# Patient Record
Sex: FEMALE | Race: WHITE | Employment: FULL TIME | URBAN - METROPOLITAN AREA
[De-identification: names, ages, dates, MRNs, and addresses within clinical notes are randomized per-mention and may not be internally consistent; named-entity substitution may affect disease eponyms.]

---

## 2022-07-14 ENCOUNTER — HOSPITAL ENCOUNTER (OUTPATIENT)
Age: 52
Setting detail: OBSERVATION
Discharge: HOME | End: 2022-07-15
Attending: EMERGENCY MEDICINE | Admitting: INTERNAL MEDICINE
Payer: COMMERCIAL

## 2022-07-14 ENCOUNTER — APPOINTMENT (OUTPATIENT)
Dept: GENERAL RADIOLOGY | Age: 52
End: 2022-07-14
Payer: COMMERCIAL

## 2022-07-14 DIAGNOSIS — R07.89 CHEST PRESSURE: Primary | ICD-10-CM

## 2022-07-14 DIAGNOSIS — R03.0 ELEVATED BLOOD PRESSURE READING: ICD-10-CM

## 2022-07-14 PROBLEM — I10 HTN (HYPERTENSION): Status: ACTIVE | Noted: 2022-07-14

## 2022-07-14 PROBLEM — K21.9 GERD (GASTROESOPHAGEAL REFLUX DISEASE): Status: ACTIVE | Noted: 2022-07-14

## 2022-07-14 PROBLEM — F32.A DEPRESSION: Status: ACTIVE | Noted: 2022-07-14

## 2022-07-14 PROBLEM — I10 UNCONTROLLED HYPERTENSION: Status: ACTIVE | Noted: 2022-07-14

## 2022-07-14 LAB
A/G RATIO: 1.7 (ref 1.1–2.2)
ALBUMIN SERPL-MCNC: 4.5 G/DL (ref 3.4–5)
ALP BLD-CCNC: 72 U/L (ref 40–129)
ALT SERPL-CCNC: 16 U/L (ref 10–40)
ANION GAP SERPL CALCULATED.3IONS-SCNC: 11 MMOL/L (ref 3–16)
AST SERPL-CCNC: 20 U/L (ref 15–37)
BASOPHILS ABSOLUTE: 0.1 K/UL (ref 0–0.2)
BASOPHILS RELATIVE PERCENT: 1 %
BILIRUB SERPL-MCNC: 0.6 MG/DL (ref 0–1)
BILIRUBIN URINE: NEGATIVE
BLOOD, URINE: NEGATIVE
BUN BLDV-MCNC: 7 MG/DL (ref 7–20)
CALCIUM SERPL-MCNC: 9.3 MG/DL (ref 8.3–10.6)
CHLORIDE BLD-SCNC: 96 MMOL/L (ref 99–110)
CLARITY: CLEAR
CO2: 26 MMOL/L (ref 21–32)
COLOR: YELLOW
CREAT SERPL-MCNC: 0.7 MG/DL (ref 0.6–1.1)
EKG ATRIAL RATE: 65 BPM
EKG DIAGNOSIS: NORMAL
EKG P AXIS: 8 DEGREES
EKG P-R INTERVAL: 160 MS
EKG Q-T INTERVAL: 436 MS
EKG QRS DURATION: 86 MS
EKG QTC CALCULATION (BAZETT): 453 MS
EKG R AXIS: 41 DEGREES
EKG T AXIS: 27 DEGREES
EKG VENTRICULAR RATE: 65 BPM
EOSINOPHILS ABSOLUTE: 0.3 K/UL (ref 0–0.6)
EOSINOPHILS RELATIVE PERCENT: 4 %
GFR AFRICAN AMERICAN: >60
GFR NON-AFRICAN AMERICAN: >60
GLUCOSE BLD-MCNC: 99 MG/DL (ref 70–99)
GLUCOSE URINE: NEGATIVE MG/DL
HCG QUALITATIVE: NEGATIVE
HCT VFR BLD CALC: 45.9 % (ref 36–48)
HEMOGLOBIN: 15.9 G/DL (ref 12–16)
KETONES, URINE: NEGATIVE MG/DL
LEUKOCYTE ESTERASE, URINE: NEGATIVE
LYMPHOCYTES ABSOLUTE: 2.2 K/UL (ref 1–5.1)
LYMPHOCYTES RELATIVE PERCENT: 25.5 %
MCH RBC QN AUTO: 34 PG (ref 26–34)
MCHC RBC AUTO-ENTMCNC: 34.5 G/DL (ref 31–36)
MCV RBC AUTO: 98.4 FL (ref 80–100)
MICROSCOPIC EXAMINATION: NORMAL
MONOCYTES ABSOLUTE: 0.6 K/UL (ref 0–1.3)
MONOCYTES RELATIVE PERCENT: 6.6 %
NEUTROPHILS ABSOLUTE: 5.4 K/UL (ref 1.7–7.7)
NEUTROPHILS RELATIVE PERCENT: 62.9 %
NITRITE, URINE: NEGATIVE
PDW BLD-RTO: 13.5 % (ref 12.4–15.4)
PH UA: 6 (ref 5–8)
PLATELET # BLD: 334 K/UL (ref 135–450)
PMV BLD AUTO: 6.6 FL (ref 5–10.5)
POTASSIUM REFLEX MAGNESIUM: 3.8 MMOL/L (ref 3.5–5.1)
PRO-BNP: 300 PG/ML (ref 0–124)
PROTEIN UA: NEGATIVE MG/DL
RBC # BLD: 4.67 M/UL (ref 4–5.2)
SODIUM BLD-SCNC: 133 MMOL/L (ref 136–145)
SPECIFIC GRAVITY UA: 1.01 (ref 1–1.03)
TOTAL PROTEIN: 7.1 G/DL (ref 6.4–8.2)
TROPONIN: <0.01 NG/ML
TROPONIN: <0.01 NG/ML
URINE REFLEX TO CULTURE: NORMAL
URINE TYPE: NORMAL
UROBILINOGEN, URINE: 0.2 E.U./DL
WBC # BLD: 8.6 K/UL (ref 4–11)

## 2022-07-14 PROCEDURE — 2580000003 HC RX 258: Performed by: INTERNAL MEDICINE

## 2022-07-14 PROCEDURE — 83880 ASSAY OF NATRIURETIC PEPTIDE: CPT

## 2022-07-14 PROCEDURE — 99285 EMERGENCY DEPT VISIT HI MDM: CPT

## 2022-07-14 PROCEDURE — G0378 HOSPITAL OBSERVATION PER HR: HCPCS

## 2022-07-14 PROCEDURE — 6370000000 HC RX 637 (ALT 250 FOR IP): Performed by: EMERGENCY MEDICINE

## 2022-07-14 PROCEDURE — 93010 ELECTROCARDIOGRAM REPORT: CPT | Performed by: INTERNAL MEDICINE

## 2022-07-14 PROCEDURE — 93005 ELECTROCARDIOGRAM TRACING: CPT | Performed by: EMERGENCY MEDICINE

## 2022-07-14 PROCEDURE — 84484 ASSAY OF TROPONIN QUANT: CPT

## 2022-07-14 PROCEDURE — 80053 COMPREHEN METABOLIC PANEL: CPT

## 2022-07-14 PROCEDURE — 6370000000 HC RX 637 (ALT 250 FOR IP): Performed by: INTERNAL MEDICINE

## 2022-07-14 PROCEDURE — 71045 X-RAY EXAM CHEST 1 VIEW: CPT

## 2022-07-14 PROCEDURE — 36415 COLL VENOUS BLD VENIPUNCTURE: CPT

## 2022-07-14 PROCEDURE — 81003 URINALYSIS AUTO W/O SCOPE: CPT

## 2022-07-14 PROCEDURE — 84703 CHORIONIC GONADOTROPIN ASSAY: CPT

## 2022-07-14 PROCEDURE — 85025 COMPLETE CBC W/AUTO DIFF WBC: CPT

## 2022-07-14 RX ORDER — ESCITALOPRAM OXALATE 10 MG/1
20 TABLET ORAL DAILY
Status: DISCONTINUED | OUTPATIENT
Start: 2022-07-15 | End: 2022-07-15 | Stop reason: HOSPADM

## 2022-07-14 RX ORDER — ATORVASTATIN CALCIUM 40 MG/1
40 TABLET, FILM COATED ORAL NIGHTLY
Status: DISCONTINUED | OUTPATIENT
Start: 2022-07-14 | End: 2022-07-15 | Stop reason: HOSPADM

## 2022-07-14 RX ORDER — SODIUM CHLORIDE 0.9 % (FLUSH) 0.9 %
5-40 SYRINGE (ML) INJECTION EVERY 12 HOURS SCHEDULED
Status: DISCONTINUED | OUTPATIENT
Start: 2022-07-14 | End: 2022-07-15 | Stop reason: HOSPADM

## 2022-07-14 RX ORDER — ASPIRIN 81 MG/1
324 TABLET, CHEWABLE ORAL ONCE
Status: COMPLETED | OUTPATIENT
Start: 2022-07-14 | End: 2022-07-14

## 2022-07-14 RX ORDER — POLYETHYLENE GLYCOL 3350 17 G/17G
17 POWDER, FOR SOLUTION ORAL DAILY PRN
Status: DISCONTINUED | OUTPATIENT
Start: 2022-07-14 | End: 2022-07-15 | Stop reason: HOSPADM

## 2022-07-14 RX ORDER — ONDANSETRON 2 MG/ML
4 INJECTION INTRAMUSCULAR; INTRAVENOUS EVERY 6 HOURS PRN
Status: DISCONTINUED | OUTPATIENT
Start: 2022-07-14 | End: 2022-07-15 | Stop reason: HOSPADM

## 2022-07-14 RX ORDER — ASPIRIN 81 MG/1
81 TABLET, CHEWABLE ORAL DAILY
Status: DISCONTINUED | OUTPATIENT
Start: 2022-07-15 | End: 2022-07-15 | Stop reason: HOSPADM

## 2022-07-14 RX ORDER — ACETAMINOPHEN 325 MG/1
650 TABLET ORAL EVERY 6 HOURS PRN
Status: DISCONTINUED | OUTPATIENT
Start: 2022-07-14 | End: 2022-07-15 | Stop reason: HOSPADM

## 2022-07-14 RX ORDER — VALSARTAN 160 MG/1
160 TABLET ORAL DAILY
Status: DISCONTINUED | OUTPATIENT
Start: 2022-07-15 | End: 2022-07-15 | Stop reason: HOSPADM

## 2022-07-14 RX ORDER — PROPRANOLOL HCL 60 MG
60 CAPSULE, EXTENDED RELEASE 24HR ORAL 2 TIMES DAILY
Status: DISCONTINUED | OUTPATIENT
Start: 2022-07-14 | End: 2022-07-15 | Stop reason: HOSPADM

## 2022-07-14 RX ORDER — LEVOTHYROXINE SODIUM 0.1 MG/1
TABLET ORAL
COMMUNITY
Start: 2022-07-01

## 2022-07-14 RX ORDER — HYDROCHLOROTHIAZIDE 25 MG/1
25 TABLET ORAL DAILY
Status: DISCONTINUED | OUTPATIENT
Start: 2022-07-15 | End: 2022-07-15 | Stop reason: HOSPADM

## 2022-07-14 RX ORDER — PANTOPRAZOLE SODIUM 40 MG/1
40 TABLET, DELAYED RELEASE ORAL
Status: DISCONTINUED | OUTPATIENT
Start: 2022-07-15 | End: 2022-07-15 | Stop reason: HOSPADM

## 2022-07-14 RX ORDER — ONDANSETRON 4 MG/1
4 TABLET, ORALLY DISINTEGRATING ORAL EVERY 8 HOURS PRN
Status: DISCONTINUED | OUTPATIENT
Start: 2022-07-14 | End: 2022-07-15 | Stop reason: HOSPADM

## 2022-07-14 RX ORDER — ESCITALOPRAM OXALATE 20 MG/1
TABLET ORAL
COMMUNITY
Start: 2022-06-05

## 2022-07-14 RX ORDER — LEVOTHYROXINE SODIUM 0.1 MG/1
100 TABLET ORAL DAILY
Status: DISCONTINUED | OUTPATIENT
Start: 2022-07-15 | End: 2022-07-15 | Stop reason: HOSPADM

## 2022-07-14 RX ORDER — ENOXAPARIN SODIUM 100 MG/ML
40 INJECTION SUBCUTANEOUS DAILY
Status: DISCONTINUED | OUTPATIENT
Start: 2022-07-15 | End: 2022-07-15 | Stop reason: HOSPADM

## 2022-07-14 RX ORDER — PROPRANOLOL HCL 60 MG
CAPSULE, EXTENDED RELEASE 24HR ORAL DAILY
COMMUNITY
Start: 2022-06-02

## 2022-07-14 RX ORDER — NITROGLYCERIN 0.4 MG/1
0.4 TABLET SUBLINGUAL EVERY 5 MIN PRN
Status: DISCONTINUED | OUTPATIENT
Start: 2022-07-14 | End: 2022-07-15 | Stop reason: HOSPADM

## 2022-07-14 RX ORDER — SODIUM CHLORIDE 0.9 % (FLUSH) 0.9 %
5-40 SYRINGE (ML) INJECTION PRN
Status: DISCONTINUED | OUTPATIENT
Start: 2022-07-14 | End: 2022-07-15 | Stop reason: HOSPADM

## 2022-07-14 RX ORDER — VALSARTAN AND HYDROCHLOROTHIAZIDE 160; 25 MG/1; MG/1
TABLET ORAL
COMMUNITY
Start: 2022-07-07

## 2022-07-14 RX ORDER — SODIUM CHLORIDE 9 MG/ML
INJECTION, SOLUTION INTRAVENOUS PRN
Status: DISCONTINUED | OUTPATIENT
Start: 2022-07-14 | End: 2022-07-15 | Stop reason: HOSPADM

## 2022-07-14 RX ORDER — ACETAMINOPHEN 650 MG/1
650 SUPPOSITORY RECTAL EVERY 6 HOURS PRN
Status: DISCONTINUED | OUTPATIENT
Start: 2022-07-14 | End: 2022-07-15 | Stop reason: HOSPADM

## 2022-07-14 RX ORDER — VALSARTAN AND HYDROCHLOROTHIAZIDE 160; 25 MG/1; MG/1
1 TABLET ORAL DAILY
Status: DISCONTINUED | OUTPATIENT
Start: 2022-07-15 | End: 2022-07-14 | Stop reason: CLARIF

## 2022-07-14 RX ADMIN — PROPRANOLOL HYDROCHLORIDE 60 MG: 60 CAPSULE, EXTENDED RELEASE ORAL at 23:20

## 2022-07-14 RX ADMIN — ASPIRIN 324 MG: 81 TABLET, CHEWABLE ORAL at 19:36

## 2022-07-14 RX ADMIN — SODIUM CHLORIDE, PRESERVATIVE FREE 10 ML: 5 INJECTION INTRAVENOUS at 23:20

## 2022-07-14 RX ADMIN — ATORVASTATIN CALCIUM 40 MG: 40 TABLET, FILM COATED ORAL at 23:20

## 2022-07-14 ASSESSMENT — ENCOUNTER SYMPTOMS
VOMITING: 0
RESPIRATORY NEGATIVE: 1
COUGH: 0
SHORTNESS OF BREATH: 0
PHOTOPHOBIA: 0
CONSTIPATION: 0
COLOR CHANGE: 0
BACK PAIN: 0
ABDOMINAL PAIN: 0
CHEST TIGHTNESS: 0
DIARRHEA: 0
NAUSEA: 0

## 2022-07-14 ASSESSMENT — LIFESTYLE VARIABLES: HOW OFTEN DO YOU HAVE A DRINK CONTAINING ALCOHOL: NEVER

## 2022-07-14 ASSESSMENT — HEART SCORE: ECG: 0

## 2022-07-14 NOTE — LETTER
[facility]  [address] [id number]  Phone: [phone]            July 15, 2022     Patient: Adelaida Torers   YOB: 1970   Date of Visit: 7/14/2022-7/15/2022       To Whom It May Concern: It is my medical opinion that Adelaida Arellano may return to work on 7/18/2022 with no restrictions. If you have any questions or concerns, please don't hesitate to call.     Sincerely,        Brittany HAILEN, RN

## 2022-07-14 NOTE — ED PROVIDER NOTES
I independently saw performed a substantive portion of the visit (history, physical, and MDM) on Adelaida Celeste. All diagnostic, treatment, and disposition decisions were made by myself in conjunction with the advanced practice provider. I have participated in the medical decision making and directed the treatment plan and disposition of the patient. For further details of Adelaida Celeste's emergency department encounter, please see the advanced practice provider's documentation. Caleb Rudolph MD, am the primary physician provider of record. CHIEF COMPLAINT  Chief Complaint   Patient presents with    Hypertension     Pt reports hypertension, /101 in triage      Briefly, Adelaida Landa is a 46 y.o. female  who presents to the ED complaining of being treated by PCP for HTN. She has chest pressure sometimes. She says when she feels \"weird\" she checks it and notes it to be up to [id number] systolic sometimes. She reports decreased activity level tolerance and rest tends to improve her symptoms. No belly pain n/v/d f/c or cough. She does not feel SOB. She is a smoker. No DM or HLD history. Unsure of FHx. Currently symptom free. Pressure radiates to the shoulders. Last stress test 2016:  Recommendation    Normal perfusion study but noted to have ischemic EKG changes during stress. FOCUSED PHYSICAL EXAMINATION  BP (!) 134/94   Pulse 65   Temp 98.6 °F (37 °C)   Resp 16   Wt 150 lb (68 kg)   SpO2 97%   BMI 26.57 kg/m²    Focused physical examination notable for no acute distress, well-appearing, well-nourished, normal speech and mentation without obvious facial droop, no obvious rash. No obvious cranial nerve deficits on my initial exam. RRR CTAB. Abd soft NTND.     The 12 lead EKG was interpreted by me as follows:  Rate: normal with a rate of 65  Rhythm: sinus  Axis: normal  Intervals: normal CO, narrow QRS, normal QTc  ST segments: no ST elevations or depressions  T waves: no abnormal inversions  Non-specific T wave changes: present  Prior EKG comparison: EKG dated 6/18/16 is not significantly different    MDM:  ED course was notable for chest pressure. Sounds anginal.  EKG nonspecific, troponin negative though. Not short of breath. Not tachycardic tachypneic or hypoxic to suggest PE. Will admit for an ACS rule out. Last tress test was 2016 as noted above and did have ischemic EKG changes during stress but never had a heart cath as a result of this. Symptom-free on reassessment so she wants to hold off on nitroglycerin for now and I will order her aspirin. Is this patient to be included in the SEP-1 Core Measure? No   Exclusion criteria - the patient is NOT to be included for SEP-1 Core Measure due to: Infection is not suspected      During the patient's ED course, the patient was given:  Medications   aspirin chewable tablet 324 mg (has no administration in time range)        CLINICAL IMPRESSION  1. Chest pressure    2. Elevated blood pressure reading        DISPOSITION  Adelaida Villegas was admitted in fair condition. The plan is to admit to the hospital at this time under the hospitalist service. Hospitalist accepted the patient and will take over the patient's care. This chart was created using Dragon dictation software. Efforts were made by me to ensure accuracy, however some errors may be present due to limitations of this technology.             Rhina Patterson MD  07/14/22 1003

## 2022-07-14 NOTE — ED PROVIDER NOTES
[facility]        Pt Name: Adelaida Jerez  MRN: [id number]  [place] 1970  Date of evaluation: 7/14/2022  Provider: TESFAYE Neff  PCP: Julius Hayes MD  Note Started: 3:53 PM EDT        I have seen and evaluated this patient with my supervising physician Catheryn Collet, MD.    [facility]       Chief Complaint   Patient presents with    Hypertension     Pt reports hypertension, /101 in triage        HISTORY OF PRESENT ILLNESS   (Location, Timing/Onset, Context/Setting, Quality, Duration, Modifying Factors, Severity, Associated Signs and Symptoms)  Note limiting factors. Chief Complaint: Chest Discomfort     Adelaida Jerez is a 46 y.o. female with past medical history of thyroid disease and hypertension who presents to the ED with complaint of chest discomfort. Patient states today she was at work and states whenever she would do something exertional she would notice that she would have a pressure-like sensation across her chest and into her right arm. Patient states she would then check her blood pressure and states she knows that her blood pressure will be elevated. States is been as high as 285 systolic. Patient states when she rests the pressure improves and the blood pressure subsides. She became concerned and came to the ED for further evaluation and treatment. She states she does have a history of hypertension and has been on medication for quite some time. She denies any recent changes in her medication. She denies any chest pain or chest pressure at this time. Denies shortness of breath. Denies pleuritic pain, orthopnea, pedal edema or calf tenderness. Denies fever or chills. Denies lightheadedness, dizziness, syncope, near syncope or diaphoresis. Denies abdominal pain, nausea/vomiting, urinary symptoms or changes in bowel movements. She states she is a smoker.   She denies history of hyperlipidemia or diabetes. She denies any history of heart disease. States she did have a stress that she thinks back in  which she states was unremarkable. Has not had any other cardiac work-up since then. She denies history of DVT or PE. Denies recent travel, trips, surgery or immobilization. Nursing Notes were all reviewed and agreed with or any disagreements were addressed in the HPI. REVIEW OF SYSTEMS    (2-9 systems for level 4, 10 or more for level 5)     Review of Systems   Constitutional: Negative for activity change, appetite change, chills, diaphoresis, fatigue and fever. Eyes: Negative for photophobia and visual disturbance. Respiratory: Negative. Negative for cough, chest tightness and shortness of breath. Cardiovascular: Positive for chest pain. Negative for palpitations and leg swelling. Gastrointestinal: Negative for abdominal pain, constipation, diarrhea, nausea and vomiting. Genitourinary: Negative for decreased urine volume, difficulty urinating, dysuria, flank pain, frequency, hematuria and urgency. Musculoskeletal: Negative for arthralgias, back pain, myalgias, neck pain and neck stiffness. Skin: Negative for color change, pallor, rash and wound. Neurological: Negative for dizziness, tremors, seizures, syncope, facial asymmetry, speech difficulty, weakness, light-headedness, numbness and headaches. Positives and Pertinent negatives as per HPI. Except as noted above in the ROS, all other systems were reviewed and negative.        PAST MEDICAL HISTORY     Past Medical History:   Diagnosis Date    Thyroid disease          SURGICAL HISTORY     Past Surgical History:   Procedure Laterality Date     SECTION      EYE SURGERY      NASAL FRACTURE SURGERY           CURRENTMEDICATIONS       Previous Medications    DIAZEPAM (VALIUM) 5 MG TABLET    Take 5 mg by mouth every 6 hours as needed for Anxiety    ESCITALOPRAM (LEXAPRO) 20 MG TABLET    TAKE 1 TABLET BY MOUTH DAILY    FERROUS SULFATE 325 (65 FE) MG TABLET    Take 325 mg by mouth daily (with breakfast)    LEVOTHYROXINE (LEVOTHROID) 75 MCG TABLET    Take 75 mcg by mouth Daily    OMEPRAZOLE (PRILOSEC) 10 MG CAPSULE    Take 10 mg by mouth daily    PROPRANOLOL (INDERAL LA) 60 MG EXTENDED RELEASE CAPSULE    TAKE 1 CAPSULE BY MOUTH TWICE DAILY    VALSARTAN-HYDROCHLOROTHIAZIDE (DIOVAN-HCT) 160-25 MG PER TABLET    TAKE 1 TABLET BY MOUTH DAILY         ALLERGIES     Patient has no known allergies. FAMILYHISTORY     History reviewed. No pertinent family history. SOCIAL HISTORY       Social History     Tobacco Use    Smoking status: Current Every Day Smoker     Packs/day: 0.50     Types: Cigarettes    Smokeless tobacco: Not on file   Substance Use Topics    Alcohol use: Yes     Comment: occ    Drug use: No       SCREENINGS    Kendal Coma Scale  Eye Opening: Spontaneous  Best Verbal Response: Oriented  Best Motor Response: Obeys commands  Kendal Coma Scale Score: 15        PHYSICAL EXAM    (up to 7 for level 4, 8 or more for level 5)     ED Triage Vitals [07/14/22 1400]   BP Temp Temp src Heart Rate Resp SpO2 Height Weight   (!) 163/101 98.6 °F (37 °C) -- 65 16 97 % -- 150 lb (68 kg)       Physical Exam  Constitutional:       General: She is not in acute distress. Appearance: Normal appearance. She is well-developed. She is not ill-appearing, toxic-appearing or diaphoretic. HENT:      Head: Normocephalic and atraumatic. Right Ear: External ear normal.      Left Ear: External ear normal.   Eyes:      General:         Right eye: No discharge. Left eye: No discharge. Conjunctiva/sclera: Conjunctivae normal.   Cardiovascular:      Rate and Rhythm: Normal rate and regular rhythm. Pulses: Normal pulses. Heart sounds: Normal heart sounds. No murmur heard. No friction rub. No gallop. Comments: 2+ radial pulses bilaterally. No pedal edema. No calf tenderness.   No JVD.  Pulmonary:      Effort: Pulmonary effort is normal. No respiratory distress. Breath sounds: Normal breath sounds. No stridor. No wheezing, rhonchi or rales. Chest:      Chest wall: No tenderness. Abdominal:      General: Abdomen is flat. Bowel sounds are normal. There is no distension. Palpations: Abdomen is soft. There is no mass. Tenderness: There is no abdominal tenderness. There is no right CVA tenderness, left CVA tenderness, guarding or rebound. Hernia: No hernia is present. Musculoskeletal:         General: Normal range of motion. Cervical back: Normal range of motion and neck supple. No rigidity or tenderness. Lymphadenopathy:      Cervical: No cervical adenopathy. Skin:     General: Skin is warm and dry. Coloration: Skin is not pale. Findings: No erythema or rash. Neurological:      Mental Status: She is alert and oriented to person, place, and time. Psychiatric:         Behavior: Behavior normal.         DIAGNOSTIC RESULTS   LABS:    Labs Reviewed   COMPREHENSIVE METABOLIC PANEL W/ REFLEX TO MG FOR LOW K - Abnormal; Notable for the following components:       Result Value    Sodium 133 (*)     Chloride 96 (*)     All other components within normal limits   BRAIN NATRIURETIC PEPTIDE - Abnormal; Notable for the following components:    Pro- (*)     All other components within normal limits   CBC WITH AUTO DIFFERENTIAL   TROPONIN   URINALYSIS WITH REFLEX TO CULTURE       When ordered only abnormal lab results are displayed. All other labs were within normal range or not returned as of this dictation. EKG: When ordered, EKG's are interpreted by the Emergency Department Physician in the absence of a cardiologist.  Please see their note for interpretation of EKG.     RADIOLOGY:   Non-plain film images such as CT, Ultrasound and MRI are read by the radiologist. Plain radiographic images are visualized and preliminarily interpreted by the ED Provider with the below findings:        Interpretation per the Radiologist below, if available at the time of this note:    XR CHEST PORTABLE   Final Result   No acute cardiopulmonary disease. XR CHEST PORTABLE    Result Date: 7/14/2022  EXAMINATION: ONE XRAY VIEW OF THE CHEST 7/14/2022 3:12 pm COMPARISON: 06/18/2016. HISTORY: ORDERING SYSTEM PROVIDED HISTORY: htn TECHNOLOGIST PROVIDED HISTORY: Reason for exam:->htn Reason for Exam: htn FINDINGS: The cardiomediastinal silhouette is unremarkable. The lungs clear. No infiltrate, pleural fluid or focal process is identified. No acute cardiopulmonary disease. PROCEDURES   Unless otherwise noted below, none     Procedures    CRITICAL CARE TIME       CONSULTS:  None      EMERGENCY DEPARTMENT COURSE and DIFFERENTIAL DIAGNOSIS/MDM:   Vitals:    Vitals:    07/14/22 1400   BP: (!) 163/101   Pulse: 65   Resp: 16   Temp: 98.6 °F (37 °C)   SpO2: 97%   Weight: 150 lb (68 kg)       Patient was given the following medications:  Medications - No data to display      Is this patient to be included in the SEP-1 Core Measure due to severe sepsis or septic shock? No   Exclusion criteria - the patient is NOT to be included for SEP-1 Core Measure due to: Infection is not suspected    Patient is a 78-year-old female who presents to the ED with complaint of elevated blood pressure. Patient states today she was at work and states every time she got up to do something she noticed that she had a pressure across her chest and into her right arm. Patient states she thought it was due to her blood pressure and states every time this happened she checked her blood pressure and was elevated. Patient states when she rest the pressure seems to go away and her blood pressure improves. She came to the ED for further evaluation and treatment. She does have a history of hypertension currently on medication.   Denies any recent changes in medication or missed dosages of medication. She denies any history of heart disease but states she is a smoker. EKG interpreted by attending. Patient states has not had cardiac work-up since she had stress test she believes in 2014. Upon review of records she had stress test back in August 2016. There was normal perfusion study on stress test but did show some ischemic EKG changes. Patient has not had any further cardiac work-up since then. Given patient's complaint of what sounds to be exertional chest pressure with previous stress test that showed some concerns for EKG changes/ischemia believe patient would benefit from admission for further evaluation and treatment. Patient denies any chest tightness or chest symptoms at this time. She is currently asymptomatic. Blood pressure was 163/101 upon arrival.  CBC showed a white count, hemoglobin and platelets. CMP relatively unremarkable. Troponin was normal.  . Urinalysis unremarkable. Chest x-ray unremarkable. Patient currently pending repeat evaluation by attending. Anticipate admission. Given end of shift will sign out to attending provider. Please see attending provider note for further disposition and treatment of patient. FINAL IMPRESSION    No diagnosis found. DISPOSITION/PLAN   DISPOSITION        PATIENT REFERRED TO:  No follow-up provider specified.     DISCHARGE MEDICATIONS:  New Prescriptions    No medications on file       DISCONTINUED MEDICATIONS:  Discontinued Medications    No medications on file              (Please note that portions of this note were completed with a voice recognition program.  Efforts were made to edit the dictations but occasionally words are mis-transcribed.)    TESFAYE Amado (electronically signed)          TESFAYE Nix  07/14/22 5165

## 2022-07-15 VITALS
DIASTOLIC BLOOD PRESSURE: 47 MMHG | OXYGEN SATURATION: 97 % | HEIGHT: 62 IN | BODY MASS INDEX: 27.91 KG/M2 | SYSTOLIC BLOOD PRESSURE: 111 MMHG | HEART RATE: 69 BPM | TEMPERATURE: 98 F | WEIGHT: 151.68 LBS | RESPIRATION RATE: 18 BRPM

## 2022-07-15 LAB
ANION GAP SERPL CALCULATED.3IONS-SCNC: 7 MMOL/L (ref 3–16)
BUN BLDV-MCNC: 9 MG/DL (ref 7–20)
CALCIUM SERPL-MCNC: 9.1 MG/DL (ref 8.3–10.6)
CHLORIDE BLD-SCNC: 100 MMOL/L (ref 99–110)
CO2: 30 MMOL/L (ref 21–32)
CREAT SERPL-MCNC: 0.7 MG/DL (ref 0.6–1.1)
GFR AFRICAN AMERICAN: >60
GFR NON-AFRICAN AMERICAN: >60
GLUCOSE BLD-MCNC: 102 MG/DL (ref 70–99)
HCT VFR BLD CALC: 45 % (ref 36–48)
HEMOGLOBIN: 15.6 G/DL (ref 12–16)
LV EF: 58 %
LV EF: 64 %
LVEF MODALITY: NORMAL
LVEF MODALITY: NORMAL
MCH RBC QN AUTO: 34.4 PG (ref 26–34)
MCHC RBC AUTO-ENTMCNC: 34.7 G/DL (ref 31–36)
MCV RBC AUTO: 99.4 FL (ref 80–100)
PDW BLD-RTO: 13.4 % (ref 12.4–15.4)
PLATELET # BLD: 311 K/UL (ref 135–450)
PMV BLD AUTO: 6.4 FL (ref 5–10.5)
POTASSIUM SERPL-SCNC: 3.9 MMOL/L (ref 3.5–5.1)
RBC # BLD: 4.53 M/UL (ref 4–5.2)
SODIUM BLD-SCNC: 137 MMOL/L (ref 136–145)
T4 FREE: 1.9 NG/DL (ref 0.9–1.8)
TROPONIN: <0.01 NG/ML
TSH REFLEX: 18.88 UIU/ML (ref 0.27–4.2)
WBC # BLD: 8 K/UL (ref 4–11)

## 2022-07-15 PROCEDURE — 2580000003 HC RX 258: Performed by: INTERNAL MEDICINE

## 2022-07-15 PROCEDURE — G0378 HOSPITAL OBSERVATION PER HR: HCPCS

## 2022-07-15 PROCEDURE — 80048 BASIC METABOLIC PNL TOTAL CA: CPT

## 2022-07-15 PROCEDURE — 85027 COMPLETE CBC AUTOMATED: CPT

## 2022-07-15 PROCEDURE — 78452 HT MUSCLE IMAGE SPECT MULT: CPT

## 2022-07-15 PROCEDURE — 93017 CV STRESS TEST TRACING ONLY: CPT | Performed by: INTERNAL MEDICINE

## 2022-07-15 PROCEDURE — 84439 ASSAY OF FREE THYROXINE: CPT

## 2022-07-15 PROCEDURE — 3430000000 HC RX DIAGNOSTIC RADIOPHARMACEUTICAL: Performed by: INTERNAL MEDICINE

## 2022-07-15 PROCEDURE — 6370000000 HC RX 637 (ALT 250 FOR IP): Performed by: INTERNAL MEDICINE

## 2022-07-15 PROCEDURE — 36415 COLL VENOUS BLD VENIPUNCTURE: CPT

## 2022-07-15 PROCEDURE — 99214 OFFICE O/P EST MOD 30 MIN: CPT | Performed by: INTERNAL MEDICINE

## 2022-07-15 PROCEDURE — A9502 TC99M TETROFOSMIN: HCPCS | Performed by: INTERNAL MEDICINE

## 2022-07-15 PROCEDURE — C8929 TTE W OR WO FOL WCON,DOPPLER: HCPCS

## 2022-07-15 PROCEDURE — 6360000002 HC RX W HCPCS: Performed by: INTERNAL MEDICINE

## 2022-07-15 PROCEDURE — 84484 ASSAY OF TROPONIN QUANT: CPT

## 2022-07-15 PROCEDURE — 84443 ASSAY THYROID STIM HORMONE: CPT

## 2022-07-15 PROCEDURE — 6360000004 HC RX CONTRAST MEDICATION: Performed by: INTERNAL MEDICINE

## 2022-07-15 RX ADMIN — ASPIRIN 81 MG: 81 TABLET, CHEWABLE ORAL at 09:39

## 2022-07-15 RX ADMIN — PERFLUTREN 1.65 MG: 6.52 INJECTION, SUSPENSION INTRAVENOUS at 11:56

## 2022-07-15 RX ADMIN — SODIUM CHLORIDE, PRESERVATIVE FREE 10 ML: 5 INJECTION INTRAVENOUS at 09:40

## 2022-07-15 RX ADMIN — HYDROCHLOROTHIAZIDE 25 MG: 25 TABLET ORAL at 09:39

## 2022-07-15 RX ADMIN — LEVOTHYROXINE SODIUM 100 MCG: 0.1 TABLET ORAL at 05:04

## 2022-07-15 RX ADMIN — ESCITALOPRAM OXALATE 20 MG: 10 TABLET ORAL at 09:39

## 2022-07-15 RX ADMIN — REGADENOSON 0.4 MG: 0.08 INJECTION, SOLUTION INTRAVENOUS at 13:29

## 2022-07-15 RX ADMIN — TETROFOSMIN 10 MILLICURIE: 1.38 INJECTION, POWDER, LYOPHILIZED, FOR SOLUTION INTRAVENOUS at 11:49

## 2022-07-15 RX ADMIN — PROPRANOLOL HYDROCHLORIDE 60 MG: 60 CAPSULE, EXTENDED RELEASE ORAL at 14:38

## 2022-07-15 RX ADMIN — PANTOPRAZOLE SODIUM 40 MG: 40 TABLET, DELAYED RELEASE ORAL at 05:04

## 2022-07-15 RX ADMIN — TETROFOSMIN 30 MILLICURIE: 1.38 INJECTION, POWDER, LYOPHILIZED, FOR SOLUTION INTRAVENOUS at 11:57

## 2022-07-15 RX ADMIN — VALSARTAN 160 MG: 160 TABLET, FILM COATED ORAL at 09:39

## 2022-07-15 NOTE — PROGRESS NOTES
Pt seen in  ED, admission completed. Pt is alert and oriented x 4. Pt lives at home with a roomate, and is being admitted for chest pressure.

## 2022-07-15 NOTE — PROGRESS NOTES
Pt admitted to 3301 from ER, pt AOX4, independent with needs, VSS, denies chest pain at this time. Oriented to room and call light. Admission questions completed, reminded POC and NPO status at midnight, verbalized understanding. Provided pt with snack and water per request.  Pt denies any needs at this time. Bed in lowest position and wheels locked.

## 2022-07-15 NOTE — CONSULTS
[facility]  [phone]        Reason for Consultation/Chief Complaint: \" Chest pain. \"    History of Present Illness:  Adelaida Parra is a 46 y.o. patient who presented to the hospital with complaints of right sided upper arm discomfort that radiates to chest and across chest.  More like a fullness. No clear exertional pattern. Seems to note elevated BP at that time. Past Medical History:   has a past medical history of Hypertension and Thyroid disease. Surgical History:   has a past surgical history that includes  section; eye surgery; Nasal fracture surgery; and Hysterectomy. Social History:   reports that she has been smoking cigarettes. She has been smoking an average of .5 packs per day. She has never used smokeless tobacco. She reports current alcohol use. She reports that she does not use drugs. Family History:  family history is not on file. Home Medications:  Were reviewed and are listed in nursing record. and/or listed below  Prior to Admission medications    Medication Sig Start Date End Date Taking?  Authorizing Provider   propranolol (INDERAL LA) 60 MG extended release capsule daily  22   Historical Provider, MD   valsartan-hydroCHLOROthiazide (DIOVAN-HCT) 160-25 MG per tablet TAKE 1 TABLET BY MOUTH DAILY 22   Historical Provider, MD   escitalopram (LEXAPRO) 20 MG tablet TAKE 1 TABLET BY MOUTH DAILY 22   Historical Provider, MD   levothyroxine (SYNTHROID) 100 MCG tablet TAKE 1 TABLET BY MOUTH DAILY 22   Historical Provider, MD   ferrous sulfate 325 (65 FE) MG tablet Take 325 mg by mouth daily (with breakfast)  Patient not taking: Reported on 2022    Historical Provider, MD   levothyroxine (LEVOTHROID) 75 MCG tablet Take 100 mcg by mouth Daily     Historical Provider, MD   diazepam (VALIUM) 5 MG tablet Take 5 mg by mouth every 6 hours as needed for Anxiety    Historical Provider, MD   omeprazole (PRILOSEC) 10 MG capsule Take 10 mg by mouth daily    Historical Provider, MD        Allergies:  Patient has no known allergies. Review of Systems:   A complete review of systems has been reviewed and updated today and is negative except as noted in the history of present illness.       Physical Examination:    Vitals:    07/15/22 0755   BP: 120/79   Pulse: 63   Resp: 16   Temp: 97.8 °F (36.6 °C)   SpO2: 97%    Weight: 151 lb 10.8 oz (68.8 kg)         General Appearance:  Alert, cooperative, no distress, appears stated age   Head:  Normocephalic, without obvious abnormality, atraumatic   Eyes:  EOMI, conjunctiva/corneas clear       Nose: Nares normal   Throat: Lips normal   Neck: Supple, symmetrical, trachea midline,  no carotid bruit or JVD       Lungs:   Clear to auscultation bilaterally, respirations unlabored   Chest Wall:  No tenderness or deformity   Heart:  Regular rate and rhythm, S1, S2 normal, no murmur, rub or gallop   Abdomen:   Soft, non-tender, bowel sounds active all four quadrants,  no masses, no organomegaly           Extremities: Extremities normal, atraumatic, no cyanosis or edema   Pulses: 2+ and symmetric   Skin: Skin color, texture, turgor normal, no rashes or lesions   Pysch: Normal mood and affect   Neurologic: Normal gross motor and sensory exam.         Labs  CBC:   Lab Results   Component Value Date/Time    WBC 8.0 07/15/2022 01:57 AM    RBC 4.53 07/15/2022 01:57 AM    HGB 15.6 07/15/2022 01:57 AM    HCT 45.0 07/15/2022 01:57 AM    MCV 99.4 07/15/2022 01:57 AM    RDW 13.4 07/15/2022 01:57 AM     07/15/2022 01:57 AM     CMP:    Lab Results   Component Value Date/Time     07/14/2022 02:18 PM    K 3.8 07/14/2022 02:18 PM    CL 96 07/14/2022 02:18 PM    CO2 26 07/14/2022 02:18 PM    BUN 7 07/14/2022 02:18 PM    CREATININE 0.7 07/14/2022 02:18 PM    GFRAA >60 07/14/2022 02:18 PM    AGRATIO 1.7 07/14/2022 02:18 PM    LABGLOM >60 07/14/2022 02:18 PM    GLUCOSE 99 07/14/2022 02:18 PM    PROT 7.1 07/14/2022 02:18 PM    CALCIUM 9.3 07/14/2022 02:18 PM    BILITOT 0.6 07/14/2022 02:18 PM    ALKPHOS 72 07/14/2022 02:18 PM    AST 20 07/14/2022 02:18 PM    ALT 16 07/14/2022 02:18 PM     LIPIDS: No components found for: TOTAL CHOLESTEROL,  HDL,  LDL,  TRIGLYCERIDES  PT/INR:  No results found for: PTINR  Lab Results   Component Value Date    TROPONINI <0.01 07/15/2022       EKG:  I have reviewed EKG with the following interpretation:  Impression:    14-JUL-2022 13:59:43 [facility] ROUTINE RECORD  Normal sinus rhythm  Nonspecific ST and T wave abnormality  Abnormal ECG    Imaging/Procedures:   Echo [facility] 5/25/2021:  Summary:   The left ventricular wall motion is normal.   There is mild mitral regurgitation. Overall left ventricular ejection fraction is estimated to be 60-65%. Myoview stress test 8/12/2016:    Summary    There is normal isotope uptake at stress and rest. There is no evidence of    myocardial ischemia or scar. There are no regional wall motion abnormalities. Normal left ventricular systolic function with ejection fraction of 64 %. Normal myocardial perfusion study. Recommendation    Normal perfusion study but noted to have ischemic EKG changes during stress. Correlate clinically. Principal Problem:    Chest pressure  Plan: Mixed features. Agree with plans for Myoview stress test and 2D echo with Doppler. Active Problems:    Uncontrolled hypertension  Plan: Much improved. Continue to adjust antihypertensives. Smoker  Plan: Cessation discussed. If stress test and echo are unremarkable, patient can be discharged home from cardiology standpoint. Thank you for allowing us to participate in the care of Adelaida Cotton. Further evaluation will be based upon the patient's clinical course and testing results. All questions and concerns were addressed to the patient/family. Alternatives to my treatment were discussed. The note was completed using EMR.  Every effort was made to ensure accuracy; however, inadvertent computerized transcription errors may be present.     Suhail Kaiser M.D.

## 2022-07-15 NOTE — PROGRESS NOTES
Discharge instructions reviewed with patient. Verbalized understanding. IV dc'd without complications. Tele box returned to nurse's station. Patient to be taken to front entrance via wheelchair.

## 2022-07-15 NOTE — H&P
Hospital Medicine History & Physical      PCP: Get Chan MD    Date of Admission: 2022    Date of Service: Pt seen/examined on 2022  an Placed in Observation. Chief Complaint:    Chief Complaint   Patient presents with    Hypertension     Pt reports hypertension, /101 in triage         History Of Present Illness: The patient is a 46 y.o. female with history of hypertension, hypothyroidism, depression, GERD, tobacco use who presented with exertional chest pressure pain radiating to right arm. No associated shortness of breath. No palpitations. No dizziness. No nausea vomiting or diaphoresis. Patient reports that lately her blood pressure has been running a little on the higher side when she is having had chest pain symptoms. In the ER she exhibited accelerated hypertension since blood pressure has improved pain has also improved. At home systolic blood pressures run in the range of 170-200. Chest x-ray with clear lung fields  Troponins negative  EKG sinus rhythm    Past Medical History:        Diagnosis Date    Hypertension     Thyroid disease        Past Surgical History:        Procedure Laterality Date     SECTION      EYE SURGERY      HYSTERECTOMY (CERVIX STATUS UNKNOWN)      NASAL FRACTURE SURGERY         Medications Prior to Admission:    Prior to Admission medications    Medication Sig Start Date End Date Taking?  Authorizing Provider   propranolol (INDERAL LA) 60 MG extended release capsule daily  22   Historical Provider, MD   valsartan-hydroCHLOROthiazide (DIOVAN-HCT) 160-25 MG per tablet TAKE 1 TABLET BY MOUTH DAILY 22   Historical Provider, MD   escitalopram (LEXAPRO) 20 MG tablet TAKE 1 TABLET BY MOUTH DAILY 22   Historical Provider, MD   levothyroxine (SYNTHROID) 100 MCG tablet TAKE 1 TABLET BY MOUTH DAILY 22   Historical Provider, MD   ferrous sulfate 325 (65 FE) MG tablet Take 325 mg by mouth daily (with breakfast)  Patient not taking: Reported on 7/14/2022    Historical Provider, MD   levothyroxine (LEVOTHROID) 75 MCG tablet Take 100 mcg by mouth Daily     Historical Provider, MD   diazepam (VALIUM) 5 MG tablet Take 5 mg by mouth every 6 hours as needed for Anxiety    Historical Provider, MD   omeprazole (PRILOSEC) 10 MG capsule Take 10 mg by mouth daily    Historical Provider, MD       Allergies:  Patient has no known allergies. Social History:  The patient currently lives with family    TOBACCO:   reports that she has been smoking cigarettes. She has been smoking about 0.50 packs per day. She has never used smokeless tobacco.  ETOH:   reports current alcohol use. Family History:  Reviewed in detail and negative for DM, Early CAD, Cancer, CVA. Positive as follows:    History reviewed. No pertinent family history. REVIEW OF SYSTEMS:   Positive for chest pressure radiating to right arm and as noted in the HPI. All other systems reviewed and negative. PHYSICAL EXAM:    BP (!) 143/85   Pulse 61   Temp 98.6 °F (37 °C)   Resp 15   Wt 150 lb (68 kg)   SpO2 96%   BMI 26.57 kg/m²     General appearance: No apparent distress appears stated age and cooperative. HEENT Normal cephalic, atraumatic without obvious deformity. Pupils equal, round, and reactive to light. Extra ocular muscles intact. Conjunctivae/corneas clear. Neck: Supple, No jugular venous distention/bruits. Lungs: Clear to auscultation, bilaterally without Rales/Wheezes/Rhonchi with good respiratory effort. Heart: Regular rate and rhythm with Normal S1/S2 without murmurs, rubs or gallops  Abdomen: Soft, non-tender or non-distended without rigidity or guarding and positive bowel sounds   Extremities: No clubbing, cyanosis, or edema bilaterally. Skin: Skin color, texture, turgor normal.  No rashes or lesions. Neurologic: Alert and oriented X 3, neurovascularly intact with sensory/motor intact upper extremities/lower extremities, bilaterally.   Cranial nerves: II-XII intact, grossly non-focal.  Mental status: Alert, oriented, thought content appropriate. CBC   Recent Labs     07/14/22  1418   WBC 8.6   HGB 15.9   HCT 45.9         RENAL  Recent Labs     07/14/22  1418   *   K 3.8   CL 96*   CO2 26   BUN 7   CREATININE 0.7     LFT'S  Recent Labs     07/14/22  1418   AST 20   ALT 16   BILITOT 0.6   ALKPHOS 72     COAG  No results for input(s): INR in the last 72 hours. CARDIAC ENZYMES  Recent Labs     07/14/22  1418   TROPONINI <0.01       U/A:    Lab Results   Component Value Date/Time    NITRITE neg 07/05/2016 04:12 PM    COLORU Yellow 07/14/2022 02:18 PM    CLARITYU Clear 07/14/2022 02:18 PM    SPECGRAV 1.015 07/14/2022 02:18 PM    LEUKOCYTESUR Negative 07/14/2022 02:18 PM    BLOODU Negative 07/14/2022 02:18 PM    GLUCOSEU Negative 07/14/2022 02:18 PM         Active Hospital Problems    Diagnosis Date Noted    Chest pressure [R07.89] 07/14/2022     Priority: Medium    HTN (hypertension) [I10] 07/14/2022     Priority: Medium    GERD (gastroesophageal reflux disease) [K21.9] 07/14/2022     Priority: Medium    Depression [J61. A] 07/14/2022     Priority: Medium    Smoker [F17.200] 07/05/2016    Acquired hypothyroidism [E03.9] 07/05/2016         ASSESSMENT/PLAN:  46 y.o. female with history of hypertension, hypothyroidism, depression, GERD, tobacco use who presented with exertional chest pressure pain radiating to right arm admitted for chest pain rule out    Plan:  -Trend troponins  -Echocardiogram  -Continue on aspirin and sublingual nitro when necessary  -Cardiology Consult  -Nuclear medicine stress test  -Continue chronic home medications  - As needed pain medication        DVT Prophylaxis: Subcut enoxaparin  Diet: General diet and n.p.o. after midnight  Code Status: Full code         Justyna Costa MD    Thank you Luis Enrique Salazar MD for the opportunity to be involved in this patient's care.  If you have any questions or concerns please feel free to contact me at [phone].

## 2022-07-15 NOTE — DISCHARGE SUMMARY
on EKG. Troponin negative x 3. Stress test with normal myocardial perfusion. Echo with EF 55-60%, no wall motion abnormalities. HTN. /101 on presentation. Continued home regimen with good control. Hypothyroidism. TSH 18.88, free T4 1.9. Continue levothyroxine. Recheck numbers in 4 weeks. Hyponatremia. Sodium 133 on presentation which appears baseline. Likely secondary to HCTZ. GERD. Continued Protonix. Tobacco use. Encouraged smoking cessation. Patient feels better. No further chest discomfort. Nandini Byers for Dc home. Reviewed DC plans, follow up and medications. Expresses understanding. Questions answered. Consults. IP CONSULT TO HOSPITALIST  IP CONSULT TO CARDIOLOGY    Physical examination on discharge day. BP (!) 111/47   Pulse 69   Temp 98 °F (36.7 °C) (Oral)   Resp 18   Ht 5' 2\" (1.575 m)   Wt 151 lb 10.8 oz (68.8 kg)   SpO2 97%   BMI 27.74 kg/m²   General appearance. Alert. Looks comfortable. HEENT. Sclera clear. Moist mucus membranes. Cardiovascular. Regular rate and rhythm, normal S1, S2. No murmur. Respiratory. Not using accessory muscles. Clear to auscultation bilaterally, no wheeze. Gastrointestinal. Abdomen soft, non-tender, not distended, normal bowel sounds  Neurology. Facial symmetry. No speech deficits. Moving all extremities equally. Extremities. No edema in lower extremities. Skin. Warm, dry, normal turgor    Condition at time of discharge stable    Medication instructions provided to patient at discharge. Medication List        CHANGE how you take these medications      levothyroxine 100 MCG tablet  Commonly known as: SYNTHROID  What changed: Another medication with the same name was removed. Continue taking this medication, and follow the directions you see here.   Notes to patient: Thyroid product  Use: Treat hypothyroidism  Side Effects: Hair loss, chest pain, irregular heart beat, irritability, insomnia            CONTINUE taking these medications

## 2022-07-15 NOTE — ED NOTES
Telemetry Box connected to patient. Called 3A- connection is good.        Radha Biggs RN  07/14/22 0522

## 2022-07-15 NOTE — PLAN OF CARE
Problem: ABCDS Injury Assessment  Goal: Absence of physical injury  Outcome: Progressing     Problem: Discharge Planning  Goal: Discharge to home or other facility with appropriate resources  Outcome: Progressing  Flowsheets (Taken 7/14/2022 2224)  Discharge to home or other facility with appropriate resources: Identify barriers to discharge with patient and caregiver

## 2022-07-15 NOTE — PROGRESS NOTES
Patient instructed on Sylvain Protocol Stress Test Procedure including possible side effects and adverse reactions. Verbalizes knowledge and understanding and denies having any questions. Pt unable to complete gxt due to fatigue. Pt switchted to lexiscan walk. Instructed on Lexiscan Stress Test Procedure including possible side effects/ adverse reactions. Patient verbalizes  understanding and denies having any questions . See [address] [place] [address] Cardiology

## 2022-07-15 NOTE — PROGRESS NOTES
[facility]    7/15/2022 8:23 AM        Name: Adelaida Torres . Admitted: 7/14/2022  Primary Care Provider: Criss Hudson MD (Tel: [phone])      Chief Complaint   Patient presents with    Hypertension     Pt reports hypertension, /101 in triage      Brief History: Patient is a 45 yo female with hx HTN, thyroid disease, GERD, tobacco use. She presented to ER with c/o chest and arm pressure and elevated BP. Has noticed intermittently since Sunday, associated with exertion. There were no acute changes on EKG, Troponin negative. Subjective:  Resting in bed, no pain at present.      Reviewed interval ancillary notes    Current Medications  levothyroxine (SYNTHROID) tablet 100 mcg, Daily  propranolol (INDERAL LA) extended release capsule 60 mg, BID  pantoprazole (PROTONIX) tablet 40 mg, QAM AC  escitalopram (LEXAPRO) tablet 20 mg, Daily  sodium chloride flush 0.9 % injection 5-40 mL, 2 times per day  sodium chloride flush 0.9 % injection 5-40 mL, PRN  0.9 % sodium chloride infusion, PRN  ondansetron (ZOFRAN-ODT) disintegrating tablet 4 mg, Q8H PRN   Or  ondansetron (ZOFRAN) injection 4 mg, Q6H PRN  acetaminophen (TYLENOL) tablet 650 mg, Q6H PRN   Or  acetaminophen (TYLENOL) suppository 650 mg, Q6H PRN  polyethylene glycol (GLYCOLAX) packet 17 g, Daily PRN  aspirin chewable tablet 81 mg, Daily  atorvastatin (LIPITOR) tablet 40 mg, Nightly  perflutren lipid microspheres (DEFINITY) injection 1.65 mg, ONCE PRN  enoxaparin (LOVENOX) injection 40 mg, Daily  nitroGLYCERIN (NITROSTAT) SL tablet 0.4 mg, Q5 Min PRN  valsartan (DIOVAN) tablet 160 mg, Daily   And  hydroCHLOROthiazide (HYDRODIURIL) tablet 25 mg, Daily        Objective:  /79   Pulse 63   Temp 97.8 °F (36.6 °C) (Oral)   Resp 16   Ht 5' 2\" (1.575 m)   Wt 151 lb 10.8 oz (68.8 kg)   SpO2 97%   BMI 27.74 kg/m²     Intake/Output Summary (Last 24 hours) at 7/15/2022 0823  Last data filed at 7/15/2022 0414  Gross per 24 hour   Intake 0 ml   Output --   Net 0 ml      Wt Readings from Last 3 Encounters:   07/15/22 151 lb 10.8 oz (68.8 kg)   07/22/16 152 lb (68.9 kg)   07/05/16 153 lb (69.4 kg)       General appearance:  Appears comfortable  Eyes: Sclera clear. Pupils equal.  ENT: Moist oral mucosa. Trachea midline, no adenopathy. Cardiovascular: Regular rhythm, normal S1, S2. No murmur. No edema in lower extremities  Respiratory: Not using accessory muscles. Good inspiratory effort. Clear to auscultation bilaterally, no wheeze or crackles. GI: Abdomen soft, no tenderness, not distended, normal bowel sounds  Musculoskeletal: No cyanosis in digits, neck supple  Neurology: CN 2-12 grossly intact. No speech or motor deficits  Psych: Normal affect. Alert and oriented in time, place and person  Skin: Warm, dry, normal turgor    Labs and Tests:  CBC:   Recent Labs     07/14/22  1418 07/15/22  0157   WBC 8.6 8.0   HGB 15.9 15.6    311     BMP:    Recent Labs     07/14/22  1418   *   K 3.8   CL 96*   CO2 26   BUN 7   CREATININE 0.7   GLUCOSE 99     Hepatic:   Recent Labs     07/14/22  1418   AST 20   ALT 16   BILITOT 0.6   ALKPHOS 72       CXR 7/14/2022:  No acute cardiopulmonary disease. Problem List  Principal Problem:    Chest pressure  Active Problems:    GERD (gastroesophageal reflux disease)    Depression    Uncontrolled hypertension    Acquired hypothyroidism    Smoker  Resolved Problems:    * No resolved hospital problems. *       Assessment & Plan:   Chest pain. No acute changes on EKG. Troponin negative x 3. Awaiting stress and echo today. HTN. /101 on presentation. BP well controlled on home regimen. Continue to follow. Hypothyroidism. TSH 3.59 (9/2021). Continue levothyroxine. Add on TSH level. Hyponatremia. Sodium 133 on presentation which appears baseline. Likely secondary to HCTZ. GERD. Continue Protonix.    Tobacco use. Encouraged smoking cessation.        Diet: Diet NPO  Code:Full Code  DVT PPX:  enoxaparin      GERALDO Cohen - CNP   7/15/2022 8:23 AM